# Patient Record
Sex: FEMALE | Race: WHITE | NOT HISPANIC OR LATINO | URBAN - METROPOLITAN AREA
[De-identification: names, ages, dates, MRNs, and addresses within clinical notes are randomized per-mention and may not be internally consistent; named-entity substitution may affect disease eponyms.]

---

## 2024-11-11 ENCOUNTER — OFFICE VISIT (OUTPATIENT)
Dept: PRIMARY CARE CLINIC | Facility: CLINIC | Age: 24
End: 2024-11-11
Payer: COMMERCIAL

## 2024-11-11 VITALS
DIASTOLIC BLOOD PRESSURE: 68 MMHG | OXYGEN SATURATION: 98 % | TEMPERATURE: 98 F | HEART RATE: 74 BPM | WEIGHT: 218.25 LBS | RESPIRATION RATE: 20 BRPM | BODY MASS INDEX: 33.08 KG/M2 | SYSTOLIC BLOOD PRESSURE: 128 MMHG | HEIGHT: 68 IN

## 2024-11-11 DIAGNOSIS — F41.1 GENERALIZED ANXIETY DISORDER WITH PANIC ATTACKS: ICD-10-CM

## 2024-11-11 DIAGNOSIS — F32.A DEPRESSION, UNSPECIFIED DEPRESSION TYPE: ICD-10-CM

## 2024-11-11 DIAGNOSIS — Z83.3 FAMILY HISTORY OF TYPE 2 DIABETES MELLITUS: ICD-10-CM

## 2024-11-11 DIAGNOSIS — F41.0 GENERALIZED ANXIETY DISORDER WITH PANIC ATTACKS: ICD-10-CM

## 2024-11-11 DIAGNOSIS — Z00.00 ROUTINE MEDICAL EXAM: Primary | ICD-10-CM

## 2024-11-11 DIAGNOSIS — Z23 INFLUENZA VACCINE NEEDED: ICD-10-CM

## 2024-11-11 DIAGNOSIS — H26.09: ICD-10-CM

## 2024-11-11 DIAGNOSIS — E66.811 OBESITY, CLASS I, BMI 30-34.9: ICD-10-CM

## 2024-11-11 PROCEDURE — 99999 PR PBB SHADOW E&M-NEW PATIENT-LVL V: CPT | Mod: PBBFAC,,, | Performed by: INTERNAL MEDICINE

## 2024-11-11 RX ORDER — PAROXETINE 10 MG/1
10 TABLET, FILM COATED ORAL EVERY MORNING
Qty: 30 TABLET | Refills: 2 | Status: SHIPPED | OUTPATIENT
Start: 2024-11-11

## 2024-11-11 RX ORDER — LEVONORGESTREL AND ETHINYL ESTRADIOL AND ETHINYL ESTRADIOL 150-30(84)
1 KIT ORAL DAILY
COMMUNITY
Start: 2024-10-29

## 2024-11-12 ENCOUNTER — LAB VISIT (OUTPATIENT)
Dept: LAB | Facility: HOSPITAL | Age: 24
End: 2024-11-12
Attending: INTERNAL MEDICINE
Payer: COMMERCIAL

## 2024-11-12 ENCOUNTER — TELEPHONE (OUTPATIENT)
Dept: BEHAVIORAL HEALTH | Facility: CLINIC | Age: 24
End: 2024-11-12
Payer: COMMERCIAL

## 2024-11-12 DIAGNOSIS — Z00.00 ROUTINE MEDICAL EXAM: ICD-10-CM

## 2024-11-12 DIAGNOSIS — F41.1 GENERALIZED ANXIETY DISORDER WITH PANIC ATTACKS: ICD-10-CM

## 2024-11-12 DIAGNOSIS — Z83.3 FAMILY HISTORY OF TYPE 2 DIABETES MELLITUS: ICD-10-CM

## 2024-11-12 DIAGNOSIS — F41.0 GENERALIZED ANXIETY DISORDER WITH PANIC ATTACKS: ICD-10-CM

## 2024-11-12 DIAGNOSIS — F32.A DEPRESSION, UNSPECIFIED DEPRESSION TYPE: ICD-10-CM

## 2024-11-12 DIAGNOSIS — H26.09: ICD-10-CM

## 2024-11-12 LAB
ALBUMIN SERPL BCP-MCNC: 3.9 G/DL (ref 3.5–5.2)
ALP SERPL-CCNC: 43 U/L (ref 40–150)
ALT SERPL W/O P-5'-P-CCNC: 33 U/L (ref 10–44)
ANION GAP SERPL CALC-SCNC: 5 MMOL/L (ref 8–16)
AST SERPL-CCNC: 18 U/L (ref 10–40)
BASOPHILS # BLD AUTO: 0.06 K/UL (ref 0–0.2)
BASOPHILS NFR BLD: 0.8 % (ref 0–1.9)
BILIRUB SERPL-MCNC: 0.6 MG/DL (ref 0.1–1)
BUN SERPL-MCNC: 10 MG/DL (ref 6–20)
CALCIUM SERPL-MCNC: 9.7 MG/DL (ref 8.7–10.5)
CHLORIDE SERPL-SCNC: 109 MMOL/L (ref 95–110)
CHOLEST SERPL-MCNC: 213 MG/DL (ref 120–199)
CHOLEST/HDLC SERPL: 3.8 {RATIO} (ref 2–5)
CO2 SERPL-SCNC: 25 MMOL/L (ref 23–29)
CREAT SERPL-MCNC: 0.8 MG/DL (ref 0.5–1.4)
CRP SERPL-MCNC: 4 MG/L (ref 0–8.2)
DIFFERENTIAL METHOD BLD: ABNORMAL
EOSINOPHIL # BLD AUTO: 0.1 K/UL (ref 0–0.5)
EOSINOPHIL NFR BLD: 1.2 % (ref 0–8)
ERYTHROCYTE [DISTWIDTH] IN BLOOD BY AUTOMATED COUNT: 12.9 % (ref 11.5–14.5)
ERYTHROCYTE [SEDIMENTATION RATE] IN BLOOD BY PHOTOMETRIC METHOD: 5 MM/HR (ref 0–36)
EST. GFR  (NO RACE VARIABLE): >60 ML/MIN/1.73 M^2
ESTIMATED AVG GLUCOSE: 88 MG/DL (ref 68–131)
GLUCOSE SERPL-MCNC: 86 MG/DL (ref 70–110)
HBA1C MFR BLD: 4.7 % (ref 4–5.6)
HCT VFR BLD AUTO: 39.1 % (ref 37–48.5)
HDLC SERPL-MCNC: 56 MG/DL (ref 40–75)
HDLC SERPL: 26.3 % (ref 20–50)
HGB BLD-MCNC: 12.7 G/DL (ref 12–16)
IMM GRANULOCYTES # BLD AUTO: 0.02 K/UL (ref 0–0.04)
IMM GRANULOCYTES NFR BLD AUTO: 0.3 % (ref 0–0.5)
LDLC SERPL CALC-MCNC: 140.2 MG/DL (ref 63–159)
LYMPHOCYTES # BLD AUTO: 3.5 K/UL (ref 1–4.8)
LYMPHOCYTES NFR BLD: 48.2 % (ref 18–48)
MCH RBC QN AUTO: 30.8 PG (ref 27–31)
MCHC RBC AUTO-ENTMCNC: 32.5 G/DL (ref 32–36)
MCV RBC AUTO: 95 FL (ref 82–98)
MONOCYTES # BLD AUTO: 0.5 K/UL (ref 0.3–1)
MONOCYTES NFR BLD: 6.8 % (ref 4–15)
NEUTROPHILS # BLD AUTO: 3.1 K/UL (ref 1.8–7.7)
NEUTROPHILS NFR BLD: 42.7 % (ref 38–73)
NONHDLC SERPL-MCNC: 157 MG/DL
NRBC BLD-RTO: 0 /100 WBC
PLATELET # BLD AUTO: 238 K/UL (ref 150–450)
PMV BLD AUTO: 13 FL (ref 9.2–12.9)
POTASSIUM SERPL-SCNC: 4.3 MMOL/L (ref 3.5–5.1)
PROT SERPL-MCNC: 7.1 G/DL (ref 6–8.4)
RBC # BLD AUTO: 4.13 M/UL (ref 4–5.4)
SODIUM SERPL-SCNC: 139 MMOL/L (ref 136–145)
TRIGL SERPL-MCNC: 84 MG/DL (ref 30–150)
TSH SERPL DL<=0.005 MIU/L-ACNC: 2.22 UIU/ML (ref 0.4–4)
WBC # BLD AUTO: 7.35 K/UL (ref 3.9–12.7)

## 2024-11-12 PROCEDURE — 83036 HEMOGLOBIN GLYCOSYLATED A1C: CPT | Performed by: INTERNAL MEDICINE

## 2024-11-12 PROCEDURE — 86039 ANTINUCLEAR ANTIBODIES (ANA): CPT | Performed by: INTERNAL MEDICINE

## 2024-11-12 PROCEDURE — 86225 DNA ANTIBODY NATIVE: CPT | Performed by: INTERNAL MEDICINE

## 2024-11-12 PROCEDURE — 80061 LIPID PANEL: CPT | Performed by: INTERNAL MEDICINE

## 2024-11-12 PROCEDURE — 85652 RBC SED RATE AUTOMATED: CPT | Performed by: INTERNAL MEDICINE

## 2024-11-12 PROCEDURE — 84443 ASSAY THYROID STIM HORMONE: CPT | Performed by: INTERNAL MEDICINE

## 2024-11-12 PROCEDURE — 36415 COLL VENOUS BLD VENIPUNCTURE: CPT | Mod: PN | Performed by: INTERNAL MEDICINE

## 2024-11-12 PROCEDURE — 86038 ANTINUCLEAR ANTIBODIES: CPT | Performed by: INTERNAL MEDICINE

## 2024-11-12 PROCEDURE — 80053 COMPREHEN METABOLIC PANEL: CPT | Performed by: INTERNAL MEDICINE

## 2024-11-12 PROCEDURE — 86140 C-REACTIVE PROTEIN: CPT | Performed by: INTERNAL MEDICINE

## 2024-11-12 PROCEDURE — 85025 COMPLETE CBC W/AUTO DIFF WBC: CPT | Performed by: INTERNAL MEDICINE

## 2024-11-12 NOTE — PROGRESS NOTES
Contacted patient for intake, Pt is unable to do it now but please call back tomorrow, 11/13/2024 at 3:00pm for intake to schedule with Misha Waldron LPC. Patient portal is not set up yet.

## 2024-11-13 ENCOUNTER — TELEPHONE (OUTPATIENT)
Dept: PRIMARY CARE CLINIC | Facility: CLINIC | Age: 24
End: 2024-11-13
Payer: COMMERCIAL

## 2024-11-13 ENCOUNTER — TELEPHONE (OUTPATIENT)
Dept: BEHAVIORAL HEALTH | Facility: CLINIC | Age: 24
End: 2024-11-13
Payer: COMMERCIAL

## 2024-11-13 DIAGNOSIS — R76.8 POSITIVE ANA (ANTINUCLEAR ANTIBODY): Primary | ICD-10-CM

## 2024-11-13 LAB
ANA PATTERN 1: NORMAL
ANA SER QL IF: POSITIVE
ANA TITR SER IF: NORMAL {TITER}

## 2024-11-13 NOTE — TELEPHONE ENCOUNTER
Portal account is not fully set up, could not send results message.   Labs are all normal except the Lupus screening is positive - Rheumatology referral ordered. There is a high rate of false-positive lupus tests, additional evaluation needed before we can draw any conclusions.

## 2024-11-13 NOTE — PROGRESS NOTES
Subjective     Patient ID: Annalee Alves is a 24 y.o. female.    Chief Complaint: Establish Care    She is new to [facility]. Recently moved back, presents to establish care. C/O exacerbation of anxiety, requesting treatment.     PM/SH: G0  Depression and Anxiety with Panic attacks, Eating disorder, and lately OCD features since adolescence. Tried a few different meds in the past, can not recall which ones. No SI, no hospital admissions.   Juvenile Cataracts, s/p bilateral cataract extraction and lens implants 10/24.   Obesity.   Tonsillectomy.   Pelvic exam 1/23 in [place].   Vaccines reviewed.    Social: No tobacco or drugs, no alcohol lately. Single, no children, active with male partner. Bachelor's degree in . 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    FMH: Arthritis in mother. Depression and ADHD in brother. Alcohol abuse in PGF. Diabetes in MGM.    NKDA.    Medications: OCP's.       Review of Systems   Constitutional:  Positive for appetite change. Negative for activity change, fatigue, fever and unexpected weight change.   HENT:  Negative for nasal congestion, ear pain, hearing loss, rhinorrhea, sneezing, sore throat, trouble swallowing and voice change.    Eyes:  Negative for pain, discharge and visual disturbance.   Respiratory:  Negative for cough, chest tightness, shortness of breath and wheezing.    Cardiovascular:  Positive for palpitations. Negative for chest pain and leg swelling.        Brief palpitations when anxious.   Gastrointestinal:  Negative for abdominal pain, blood in stool, constipation, diarrhea, nausea and vomiting.   Endocrine: Negative for polydipsia and polyuria.   Genitourinary:  Negative for difficulty urinating, dysuria, frequency, hematuria, menstrual problem and pelvic pain.        Menstrual period every 3 months on current OCP's.   Musculoskeletal:  Negative for arthralgias, gait problem, joint swelling, myalgias and neck pain.   Integumentary:  Negative for color change and  "rash.   Neurological:  Positive for headaches. Negative for dizziness, syncope, facial asymmetry, speech difficulty, weakness and numbness.        "Tension" in back of head and neck.   Hematological:  Negative for adenopathy. Does not bruise/bleed easily.   Psychiatric/Behavioral:  Positive for dysphoric mood and sleep disturbance. Negative for agitation, behavioral problems, confusion, decreased concentration, self-injury and suicidal ideas. The patient is nervous/anxious.         Anxious, occasional panic, "intrusive thoughts", binge eating, repetitive hand washing, loss of appetite, decreased sleep, anhedonia and decreased motivation. No missed school or work, but does skip out on social gatherings.          Objective   Vitals:    11/11/24 1458   BP: 128/68   BP Location: Right arm   Patient Position: Sitting   Pulse: 74   Resp: 20   Temp: 98.1 °F (36.7 °C)   TempSrc: Oral   SpO2: 98%   Weight: 99 kg (218 lb 4.1 oz)   Height: 5' 8" (1.727 m)   BMI=33.2  Physical Exam  Vitals reviewed.   Constitutional:       General: She is not in acute distress.     Appearance: She is well-developed. She is obese. She is not diaphoretic.   HENT:      Head: Normocephalic and atraumatic.      Right Ear: Tympanic membrane and ear canal normal.      Left Ear: Tympanic membrane and ear canal normal.      Nose: Nose normal. No congestion.      Mouth/Throat:      Mouth: Mucous membranes are moist.      Pharynx: Oropharynx is clear.   Eyes:      General: No scleral icterus.     Extraocular Movements: Extraocular movements intact.      Conjunctiva/sclera: Conjunctivae normal.      Right eye: Right conjunctiva is not injected.      Left eye: Left conjunctiva is not injected.      Comments: Bilateral lens implants.   Neck:      Thyroid: No thyromegaly.      Vascular: No JVD.   Cardiovascular:      Rate and Rhythm: Normal rate and regular rhythm.      Pulses: Normal pulses.      Heart sounds: Normal heart sounds. No murmur heard.     No " friction rub. No gallop.   Pulmonary:      Effort: Pulmonary effort is normal. No respiratory distress.      Breath sounds: Normal breath sounds. No wheezing, rhonchi or rales.   Abdominal:      General: Bowel sounds are normal. There is no distension.      Palpations: Abdomen is soft. There is no mass.      Tenderness: There is no abdominal tenderness.   Musculoskeletal:         General: No tenderness or deformity. Normal range of motion.      Cervical back: Normal range of motion and neck supple.      Right lower leg: No edema.      Left lower leg: No edema.   Lymphadenopathy:      Cervical: No cervical adenopathy.   Skin:     General: Skin is warm and dry.      Coloration: Skin is not pale.      Findings: No erythema or rash.      Nails: There is no clubbing.   Neurological:      General: No focal deficit present.      Mental Status: She is alert and oriented to person, place, and time.      Cranial Nerves: No cranial nerve deficit.      Motor: No weakness or abnormal muscle tone.      Coordination: Coordination normal.      Gait: Gait normal.   Psychiatric:         Attention and Perception: Attention normal.         Mood and Affect: Mood and affect normal.         Speech: Speech normal.         Behavior: Behavior normal.         Thought Content: Thought content normal.         Judgment: Judgment normal.      Comments: Pleasant, openly talkative in no acute emotional distress.          Assessment and Plan     1. Routine medical exam  -     CBC Auto Differential; Future; Expected date: 11/11/2024  -     Comprehensive Metabolic Panel; Future; Expected date: 11/11/2024  -     Lipid Panel; Future; Expected date: 11/11/2024    2. Generalized anxiety disorder with panic attacks  -     TSH; Future; Expected date: 11/11/2024  -     Ambulatory referral/consult to Primary Care Behavioral Health (Non-Opioids); Future; Expected date: 11/18/2024  -     paroxetine (PAXIL) 10 MG tablet; Take 1 tablet (10 mg total) by mouth  every morning.  Dispense: 30 tablet; Refill: 2    3. Depression, unspecified depression type  -     TSH; Future; Expected date: 11/11/2024  -     Ambulatory referral/consult to Primary Care Behavioral Health (Non-Opioids); Future; Expected date: 11/18/2024  -     paroxetine (PAXIL) 10 MG tablet; Take 1 tablet (10 mg total) by mouth every morning.  Dispense: 30 tablet; Refill: 2    4. Other juvenile cataract of both eyes  -     Sedimentation rate; Future; Expected date: 11/11/2024  -     C-Reactive Protein; Future; Expected date: 11/11/2024  -     PUSHPA Screen w/Reflex; Future; Expected date: 11/11/2024    5. Obesity, Class I, BMI 30-34.9        -    lean diet and regular exercise encouraged.     6. Family history of type 2 diabetes mellitus  -     Hemoglobin A1C; Future; Expected date: 11/11/2024    7. Influenza vaccine needed - Flu shot today.       Follow up in about 2 months (around 1/11/2025).

## 2024-11-13 NOTE — PROGRESS NOTES
Contacted patient for intake, no answer left VM to please call [phone]. Portal is not set up yet.

## 2024-11-14 ENCOUNTER — TELEPHONE (OUTPATIENT)
Dept: BEHAVIORAL HEALTH | Facility: CLINIC | Age: 24
End: 2024-11-14
Payer: COMMERCIAL

## 2024-11-14 NOTE — PROGRESS NOTES
CHW reached out to pt to complete intake, no answer, LVM to pls call to schedule an appt. No patient portal is set up yet.

## 2024-11-15 LAB
ANTI SM ANTIBODY: 0.07 RATIO (ref 0–0.99)
ANTI SM/RNP ANTIBODY: 0.06 RATIO (ref 0–0.99)
ANTI-SM INTERPRETATION: NEGATIVE
ANTI-SM/RNP INTERPRETATION: NEGATIVE
ANTI-SSA ANTIBODY: 0.05 RATIO (ref 0–0.99)
ANTI-SSA INTERPRETATION: NEGATIVE
ANTI-SSB ANTIBODY: 0.05 RATIO (ref 0–0.99)
ANTI-SSB INTERPRETATION: NEGATIVE
DSDNA AB SER-ACNC: NORMAL [IU]/ML

## 2024-11-18 NOTE — TELEPHONE ENCOUNTER
Called pt to give information left by provider. Pt did not answer. LVM with instructions to call back.

## 2024-11-20 ENCOUNTER — TELEPHONE (OUTPATIENT)
Dept: BEHAVIORAL HEALTH | Facility: CLINIC | Age: 24
End: 2024-11-20
Payer: COMMERCIAL

## 2024-11-20 NOTE — PROGRESS NOTES
Behavioral Health Community Health Worker  Initial Assessment  Completed by:  Susanne Clark     Date:  11/20/2024    Patient Enrollment in Behavioral Health Program:  Patient verbalized understanding of Behavioral Health Integration services to include:  Patient understands that CHW, LCSW, PharmD and consulting Psychiatrist are members of the care team working collaboratively with his/her primary care provider: Yes  Patient understands that activation of their [facility] patient portal account is required for accessing the full scope of team services: Yes  Patient understands that some counseling sessions may occur via video: Yes  Clinic visits with the psychiatrist may be subject to a co-pay based on your insurance: Yes  Patient consents to enroll in BHI program: Yes    Assessments     Single Item Health Literacy Scale:  How often do you need to have someone help you read instructions, pamphlets or other written material from your doctor or pharmacy?: Never    Promis 10:  Promis 10 Responses  In general, would you say your health is: Very good  In general, would you say your quality of life is: Very good  In general, how would you rate your physical health?: Good  In general, how would you rate your mental health, including your mood and your ability to think?: Fair  In general, how would you rate your satisfaction with your social activities and relationships?: Fair  In general, please rate how well you carry out your usual social activities and roles. (This includes activities at home, at work and in your community, and responsibilities as a parent, child, spouse, employee, friend, etc.): Good  To what extent are you able to carry out your everyday physical activities such as walking, climbing stairs, carrying groceries, or moving a chair? : Completely  How often have you been bothered by emotional problems such as feeling anxious, depressed or irritable?: Often  In the past 7 days, how would you rate your fatigue  on average?: Moderate  In the past 7 days, on a scale of 0 to 10 (where 0 is no pain and 10 is the worst pain imaginable) how would you rate your pain on average?: 2  Global Physical Health: 13  Global Mental health Score: 12    Depression PHQ:      11/20/2024     8:49 AM   PHQ9   Little interest or pleasure in doing things 2   Feeling down, depressed, or hopeless 2   Trouble falling or staying asleep, or sleeping too much 3   Feeling tired or having little energy 3   Poor appetite or overeating 3   Feeling bad about yourself - or that you are a failure or have let yourself or your family down 2   Trouble concentrating on things, such as reading the newspaper or watching television 2   Moving or speaking so slowly that other people could have noticed. Or the opposite - being so fidgety or restless that you have been moving around a lot more than usual 1   PHQ-9 Total Score 18         Generalized Anxiety Disorder 7-Item Scale:      11/20/2024     8:52 AM   GAD7   1. Feeling nervous, anxious, or on edge? 3   2. Not being able to stop or control worrying? 3   3. Worrying too much about different things? 3   4. Trouble relaxing? 2   5. Being so restless that it is hard to sit still? 3   6. Becoming easily annoyed or irritable? 2   7. Feeling afraid as if something awful might happen? 3   8. If you checked off any problems, how difficult have these problems made it for you to do your work, take care of things at home, or get along with other people? 2   EMILY-7 Score 19       History     Social History     Socioeconomic History    Marital status: Single   Tobacco Use    Smoking status: Never    Smokeless tobacco: Never   Substance and Sexual Activity    Alcohol use: Not Currently    Drug use: Never    Sexual activity: Yes     Partners: Male     Birth control/protection: OCP, Condom   Social History Narrative    Single, no children. Bachelors Degree in . Going to Hair School. Regular diet, minimal caffeine.  "Exercise running, cycling, yoga six days per week.      Social Drivers of Health     Financial Resource Strain: Medium Risk (11/4/2024)    Overall Financial Resource Strain (CARDIA)     Difficulty of Paying Living Expenses: Somewhat hard   Food Insecurity: No Food Insecurity (11/4/2024)    Hunger Vital Sign     Worried About Running Out of Food in the Last Year: Never true     Ran Out of Food in the Last Year: Never true   Physical Activity: Sufficiently Active (11/4/2024)    Exercise Vital Sign     Days of Exercise per Week: 6 days     Minutes of Exercise per Session: 50 min   Stress: Stress Concern Present (11/4/2024)    [employer] - Occupational Stress Questionnaire     Feeling of Stress : Very much   Housing Stability: Unknown (11/4/2024)    Housing Stability Vital Sign     Unable to Pay for Housing in the Last Year: No       Call Summary     Patient was referred to the BHI (Non-opioid) program by Primary Care Provider, Dr. Sheba Galan.  CHW contacted Annalee Alves who reports depression and anxiety that limits her activities of daily living (ADLs).   Patient scored "18" on the PHQ9 and "19" on the EMILY 7. Based on these scores patient is eligible for the Behavioral Health Integration (Non-opioid) Program. CHW completed the intake and scheduled a new patient virtual appointment for patient with Misha Waldron LPC on Monday, 12/2/2024 at 3:30pm.       "

## 2024-11-29 NOTE — PROGRESS NOTES
"Primary Care Behavioral Health Integration: Initial  Date:  12/02/2024  Referral Source:  Sheba Galan MD  Type of Visit:  Video Session  Length of Appointment:  37 minutes spent face-to-face and 14 minutes spent in non face-to-face clinical care.    The patient location is:  [place] [address]  The patient phone number is: [phone]  Visit type: Virtual visit with synchronous audio and video  Each patient to whom he or she provides medical services by telemedicine is:  (1) informed of the relationship between the physician and patient and the respective role of any other health care provider with respect to management of the patient; and (2) notified that he or she may decline to receive medical services by telemedicine and may withdraw from such care at any time.    Chief Complaint/Reason for Encounter:  Anxiety and Depression    History of Present Illness: Annalee Alves, a 24 y.o. female referred by Sheba Galan MD.  Patient was seen, examined and chart was reviewed. Met with patient.       LPC provided a description of the [facility] program, reviewed confidentialty and limits to confidentiality, and discussed safety planning in the event patient experiences suicidal or homicidal ideation or plans to harm herself.  Patient began this Initial Assessment by stating she experiences symptoms of Obsessive-Compulsive Disorder and Depression.  Noted the OCD symptoms began in adolescence.  Patient is currently prescribed Paxil 10 mg which she stated has been helping with her anxiety, but has not stopped the compulsive behaviors.  Patient noted she has a difficult time going into her own bathroom when she knows others have used it.  Stated she can visualize germs.  Reported she often checks to see if her car doors are locked.  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  She has no problems getting to work on time.  Reported symptoms include anhedonia, insomnia, worthlessness/guilt, hopelessness, fatigue, difficulty concentrating, decreased appetite, excessive worrying, restlessness, panic attacks, and obsessions/compulsions.  Patient entered into an Intensive Eating Disorder Program which focused on purging, restricting, and bingeing.  Patient will be referred to Psychiatry for longer-term therapy based on the severity of her symptoms.          Past Medical History:   Diagnosis Date    Generalized anxiety disorder with panic attacks          Current Outpatient Medications:     paroxetine (PAXIL) 10 MG tablet, Take 1 tablet (10 mg total) by mouth every morning., Disp: 30 tablet, Rfl: 2    SIMPESSE 0.15 mg-30 mcg (84)/10 mcg (7) 3MPk, Take 1 tablet by mouth once daily., Disp: , Rfl:     Current symptoms:  Depression Symptoms: anhedonia, insomnia, worthlessness/guilt, hopelessness, fatigue, difficulty concentrating, and decreased appetite.  Anxiety Symptoms: excessive worrying, restlessness, panic attacks, and obsessions/compulsions.  Sleep Difficulties: Patient reports difficulty falling asleep and Patient reports non-restful sleep  Manic Symptoms:  denies.  Psychosis: denies .    Risk assessment:  Patient reports no suicidal ideation  Patient reports no homicidal ideation  Patient reports no self-injurious behavior  Patient reports no violent behavior    Patient advised to call [phone] or present the the nearest ED if they experience suicidal or homicidal ideation, plan or intent.      Psychiatric History:  Diagnosis:    Current Psychiatric Medication: Yes - Paxil 10 mg. 1 tablet by mouth every morning. They are interested in medication changes.   Medication Trial History:  Medication Trials: Yes - Lexapro, Wellbutrin   Outpatient Treatment: Yes - 2019 - 2021 - Intensive Eating Disorder Program - purging, restricting, and bingeing.      Inpatient  Treatment: No   Suicide Attempts: No   Access to Firearms: No   History of Trauma: No   Family Psychiatric History: Older brother - Lost time - Dissociation     Current and Past Substance Use:  Alcohol: Wine - one or two a month   Drugs: None   Nicotine: None   Caffeine:  Coffee and tea daily      Mental Status Exam  General Appearance:  appears stated age, neatly dressed, well groomed   Speech: normal tone, normal rate, normal pitch, normal volume      Level of Cooperation: cooperative      Thought Processes: linear, logical, goal-directed   Mood: anxious      Thought Content: {relevant and appropriate   Affect: anxious   Orientation: Oriented x4   Memory/Attention/Concentration: No gross cognitive deficits made evident during conversation   Judgment & Insight: good   Language  intact          No data to display                    11/20/2024     8:52 AM   GAD7   1. Feeling nervous, anxious, or on edge? 3   2. Not being able to stop or control worrying? 3   3. Worrying too much about different things? 3   4. Trouble relaxing? 2   5. Being so restless that it is hard to sit still? 3   6. Becoming easily annoyed or irritable? 2   7. Feeling afraid as if something awful might happen? 3   8. If you checked off any problems, how difficult have these problems made it for you to do your work, take care of things at home, or get along with other people? 2   EMILY-7 Score 19       Impression: Initial appointment focused on gathering history, identifying treatment goals and developing a treatment plan.      Patient experiences anhedonia, insomnia, worthlessness/guilt, hopelessness, fatigue, difficulty concentrating, decreased appetite, excessive worrying, restlessness, panic attacks, and obsessions/compulsions as evidenced by fear of uncertainty, racing and intrusive thoughts, and irritability.  These symptoms are making it difficult for patient to function effectively.      Diagnosis:  No diagnosis found.    Treatment Goals  and Plan:   Anxiety: reducing negative automatic thoughts, reducing physical symptoms of anxiety, and reducing time spent worrying (<30 minutes/day)  Depression: reducing excessive guilt, reducing fatigue, and reducing negative automatic thoughts    Future treatment will utilize CBT, Problem-solving Therapy, and Solution-focused Therapy.      Return to Clinic:  Patient will be referred to Psychiatry for the treatment of her Obsessive-Compulsive Disorder and Depression.

## 2024-12-02 ENCOUNTER — CLINICAL SUPPORT (OUTPATIENT)
Dept: BEHAVIORAL HEALTH | Facility: CLINIC | Age: 24
End: 2024-12-02
Payer: COMMERCIAL

## 2024-12-02 DIAGNOSIS — F42.9 OBSESSIVE-COMPULSIVE DISORDER, UNSPECIFIED TYPE: Primary | ICD-10-CM

## 2024-12-02 DIAGNOSIS — F32.A DEPRESSION, UNSPECIFIED DEPRESSION TYPE: ICD-10-CM

## 2024-12-02 DIAGNOSIS — F41.0 GENERALIZED ANXIETY DISORDER WITH PANIC ATTACKS: ICD-10-CM

## 2024-12-02 DIAGNOSIS — F41.1 GENERALIZED ANXIETY DISORDER WITH PANIC ATTACKS: ICD-10-CM

## 2024-12-02 PROCEDURE — 90791 PSYCH DIAGNOSTIC EVALUATION: CPT | Mod: 95,,, | Performed by: COUNSELOR

## 2024-12-03 ENCOUNTER — PATIENT MESSAGE (OUTPATIENT)
Dept: BEHAVIORAL HEALTH | Facility: CLINIC | Age: 24
End: 2024-12-03
Payer: COMMERCIAL

## 2024-12-04 ENCOUNTER — OFFICE VISIT (OUTPATIENT)
Dept: RHEUMATOLOGY | Facility: CLINIC | Age: 24
End: 2024-12-04
Payer: COMMERCIAL

## 2024-12-04 VITALS
HEART RATE: 90 BPM | HEIGHT: 68 IN | WEIGHT: 211.19 LBS | SYSTOLIC BLOOD PRESSURE: 119 MMHG | DIASTOLIC BLOOD PRESSURE: 79 MMHG | BODY MASS INDEX: 32.01 KG/M2

## 2024-12-04 DIAGNOSIS — R76.8 POSITIVE ANA (ANTINUCLEAR ANTIBODY): ICD-10-CM

## 2024-12-04 DIAGNOSIS — Z98.49 HISTORY OF CATARACT EXTRACTION, UNSPECIFIED LATERALITY: Primary | ICD-10-CM

## 2024-12-04 PROCEDURE — 3078F DIAST BP <80 MM HG: CPT | Mod: CPTII,S$GLB,, | Performed by: STUDENT IN AN ORGANIZED HEALTH CARE EDUCATION/TRAINING PROGRAM

## 2024-12-04 PROCEDURE — 99999 PR PBB SHADOW E&M-EST. PATIENT-LVL III: CPT | Mod: PBBFAC,,, | Performed by: STUDENT IN AN ORGANIZED HEALTH CARE EDUCATION/TRAINING PROGRAM

## 2024-12-04 PROCEDURE — 1159F MED LIST DOCD IN RCRD: CPT | Mod: CPTII,S$GLB,, | Performed by: STUDENT IN AN ORGANIZED HEALTH CARE EDUCATION/TRAINING PROGRAM

## 2024-12-04 PROCEDURE — 3008F BODY MASS INDEX DOCD: CPT | Mod: CPTII,S$GLB,, | Performed by: STUDENT IN AN ORGANIZED HEALTH CARE EDUCATION/TRAINING PROGRAM

## 2024-12-04 PROCEDURE — 3044F HG A1C LEVEL LT 7.0%: CPT | Mod: CPTII,S$GLB,, | Performed by: STUDENT IN AN ORGANIZED HEALTH CARE EDUCATION/TRAINING PROGRAM

## 2024-12-04 PROCEDURE — 3074F SYST BP LT 130 MM HG: CPT | Mod: CPTII,S$GLB,, | Performed by: STUDENT IN AN ORGANIZED HEALTH CARE EDUCATION/TRAINING PROGRAM

## 2024-12-04 PROCEDURE — 99204 OFFICE O/P NEW MOD 45 MIN: CPT | Mod: S$GLB,,, | Performed by: STUDENT IN AN ORGANIZED HEALTH CARE EDUCATION/TRAINING PROGRAM

## 2024-12-04 NOTE — PROGRESS NOTES
RHEUMATOLOGY OUTPATIENT CLINIC NOTE    12/4/2024    Attending Rheumatologist: Marilee Jane  Primary Care Provider: Sheba Galan MD   Physician Requesting Consultation: Sheba Galan MD  [address] [place] [address]  Chief Complaint/Reason For Consultation:  Positive PUSHPA      Subjective:       HPI  Annalee Alves is a 24 y.o. White female who comes for evaluation of positive PUSHPA    She has a history of bilateral cataracts that was diagnosed about 2 years ago and had cataract surgery 2 months ago. She was told by ophthalmologist (her uncle) that it looked as she has had inflammation in the past but she does not recall any episodes of eye redness, eye pain, light sensitivity.  Has intermittent areas of dry skin around the mouth.   Reports pain in bilateral ankles and knees, present at the end of the day, no swelling or erythema. Pain is not too severe for her to take medication. Aggravated after workout  (running and strength training)   Denies any photosensitivity, oral ulcers, CP, SOB, hair loss, alopecia, raynaud's, blood in stool, dysphagia.     No smoking or vaping. No alcohol use. No illicit drug use.   She works in retail.     She reports that early cataract development runs in the family.    Grandmother with psoriatic arthritis.     Labs  11/2024  PUSHPA 1:1280 speckled  Negative dsDNA, SSA, SSB, SM, SM/RNP  CBC without cytopenia  CMP unremarkable  ESR, CRP negative  TSH normal    Review of Systems   Constitutional:  Negative for fever and unexpected weight change.   HENT:  Negative for mouth sores and trouble swallowing.    Eyes:  Negative for redness.   Respiratory:  Negative for cough and shortness of breath.    Cardiovascular:  Negative for chest pain.   Gastrointestinal:  Negative for constipation and diarrhea.   Genitourinary:  Negative for dysuria and genital sores.   Integumentary:  Positive for rash.   Neurological:  Positive for headaches.    Hematological:  Does not bruise/bleed easily.        Chronic comorbid conditions affecting medical decision making today:  Past Medical History:   Diagnosis Date    Generalized anxiety disorder with panic attacks      Past Surgical History:   Procedure Laterality Date    CATARACT EXTRACTION W/ INTRAOCULAR LENS  IMPLANT, BILATERAL  10/2024    TONSILLECTOMY       Family History   Problem Relation Name Age of Onset    Arthritis Mother      Depression Brother      ADD / ADHD Brother      Diabetes Maternal Grandmother      Alcohol abuse Paternal Grandfather       Social History     Substance and Sexual Activity   Alcohol Use Not Currently     Social History     Tobacco Use   Smoking Status Never   Smokeless Tobacco Never     Social History     Substance and Sexual Activity   Drug Use Never       Current Outpatient Medications:     paroxetine (PAXIL) 10 MG tablet, Take 1 tablet (10 mg total) by mouth every morning., Disp: 30 tablet, Rfl: 2    SIMPESSE 0.15 mg-30 mcg (84)/10 mcg (7) 3MPk, Take 1 tablet by mouth once daily., Disp: , Rfl:      Objective:         Vitals:    12/04/24 1120   BP: 119/79   Pulse: 90     Physical Exam   Constitutional: She is oriented to person, place, and time. She appears well-developed.   HENT:   Head: Normocephalic.   Pulmonary/Chest: Effort normal.   Musculoskeletal:         General: No swelling or tenderness.      Cervical back: Neck supple.   Neurological: She is alert and oriented to person, place, and time.   Skin:   No Heliotrope rash  No Gottron papules  No sclerodactyly   No Raynaud's  No Petechiae  No discoid lesions  No alopecia  Normal Capillaroscopy   Vitals reviewed.      Reviewed old and all outside pertinent medical records available.    All lab results personally reviewed and interpreted by me.  Lab Results   Component Value Date    WBC 7.35 11/12/2024    HGB 12.7 11/12/2024    HCT 39.1 11/12/2024    MCV 95 11/12/2024    MCH 30.8 11/12/2024    MCHC 32.5 11/12/2024    RDW 12.9  "11/12/2024     11/12/2024    MPV 13.0 (H) 11/12/2024       Lab Results   Component Value Date     11/12/2024    K 4.3 11/12/2024     11/12/2024    CO2 25 11/12/2024    GLU 86 11/12/2024    BUN 10 11/12/2024    CALCIUM 9.7 11/12/2024    PROT 7.1 11/12/2024    ALBUMIN 3.9 11/12/2024    BILITOT 0.6 11/12/2024    AST 18 11/12/2024    ALKPHOS 43 11/12/2024    ALT 33 11/12/2024       No results found for: "COLORU", "APPEARANCEUA", "SPECGRAV", "PHUR", "PHUA", "PROTEINUA", "GLUCOSEU", "KETONESU", "BLOODU", "LEUKOCYTESUR", "NITRITE", "UROBILINOGEN"    Lab Results   Component Value Date    CRP 4.0 11/12/2024       Lab Results   Component Value Date    SEDRATE 5 11/12/2024       No components found for: "25OHVITDTOT", "16BNIQMA9", "51PHWHWK3", "METHODNOTE"    No results found for: "URICACID"    No results found for: "QUANTIFERON", "HEPBCOREIGG", "HEPBSAB", "HEPBSAG", "HEPCAB"    Imaging:  All imaging reviewed and independently interpreted by me.     ASSESSMENT / PLAN:     Annalee Alves is a 24 y.o. White female with:    1. Positive PUSHPA (antinuclear antibody)  -high titer PUSHPA :1280 homogeneous with negative profile  -no clinical signs or symptoms suggestive of CTD now. Discussed what symptoms to look for and to inform me if anything.   -complete workup with complements and UPCR  -reassurance    2. History of cataract extraction, unspecified laterality (Primary)  -she has a history of bilateral cataract surgery. She was told by ophthalmologist (her uncle) that it looked as she has had inflammation in the past but she does not recall any episodes of eye redness, eye pain, light sensitivity. She reports extensive family history of early cataract development but she is the youngest one.   -check HLAB27 now  -discussed with patient that if she develops any eye symptoms, to contact ophthalmology for prompt evaluation  -doubt positive PUSHPA is relate to this.     Follow up if symptoms worsen or fail to " improve.    Method of contact with patient concerns: [facility]    Disclaimer:  This note is prepared using voice recognition software and as such is likely to have errors and has not been proof read. Please contact me for questions.     Time spent: 20 minutes in face to face discussion concerning diagnosis, prognosis, review of lab and test results, benefits of treatment as well as management of disease, counseling of patient and coordination of care between various health care providers.      Marilee Jane M.D.  Rheumatology  [facility]

## 2024-12-04 NOTE — PROGRESS NOTES
12/1/2024     8:08 PM   Rapid3 Question Responses and Scores   MDHAQ Score 0.1   Psychologic Score 7.7   Pain Score 4   When you awakened in the morning OVER THE LAST WEEK, did you feel stiff? Yes   If Yes, please indicate the number of hours until you are as limber as you will be for the day 1   Fatigue Score 7   Global Health Score 3   RAPID3 Score 2.44

## 2025-01-22 DIAGNOSIS — F32.A DEPRESSION, UNSPECIFIED DEPRESSION TYPE: ICD-10-CM

## 2025-01-22 DIAGNOSIS — F41.0 GENERALIZED ANXIETY DISORDER WITH PANIC ATTACKS: ICD-10-CM

## 2025-01-22 DIAGNOSIS — F41.1 GENERALIZED ANXIETY DISORDER WITH PANIC ATTACKS: ICD-10-CM

## 2025-01-22 NOTE — TELEPHONE ENCOUNTER
No care due was identified.  [facility] Embedded Care Due Messages. Reference number: [id number].   1/22/2025 12:28:58 PM CST

## 2025-01-26 RX ORDER — PAROXETINE 10 MG/1
10 TABLET, FILM COATED ORAL EVERY MORNING
Qty: 30 TABLET | Refills: 3 | Status: SHIPPED | OUTPATIENT
Start: 2025-01-26

## 2025-04-07 ENCOUNTER — OFFICE VISIT (OUTPATIENT)
Dept: PRIMARY CARE CLINIC | Facility: CLINIC | Age: 25
End: 2025-04-07
Payer: COMMERCIAL

## 2025-04-07 VITALS
TEMPERATURE: 98 F | WEIGHT: 213.19 LBS | DIASTOLIC BLOOD PRESSURE: 72 MMHG | BODY MASS INDEX: 32.31 KG/M2 | OXYGEN SATURATION: 98 % | HEART RATE: 68 BPM | HEIGHT: 68 IN | SYSTOLIC BLOOD PRESSURE: 114 MMHG

## 2025-04-07 DIAGNOSIS — F41.0 GENERALIZED ANXIETY DISORDER WITH PANIC ATTACKS: Primary | ICD-10-CM

## 2025-04-07 DIAGNOSIS — Z12.4 CERVICAL CANCER SCREENING: ICD-10-CM

## 2025-04-07 DIAGNOSIS — F32.9 REACTIVE DEPRESSION: ICD-10-CM

## 2025-04-07 DIAGNOSIS — F41.1 GENERALIZED ANXIETY DISORDER WITH PANIC ATTACKS: Primary | ICD-10-CM

## 2025-04-07 PROCEDURE — 1159F MED LIST DOCD IN RCRD: CPT | Mod: CPTII,S$GLB,, | Performed by: INTERNAL MEDICINE

## 2025-04-07 PROCEDURE — 3078F DIAST BP <80 MM HG: CPT | Mod: CPTII,S$GLB,, | Performed by: INTERNAL MEDICINE

## 2025-04-07 PROCEDURE — 3008F BODY MASS INDEX DOCD: CPT | Mod: CPTII,S$GLB,, | Performed by: INTERNAL MEDICINE

## 2025-04-07 PROCEDURE — 1160F RVW MEDS BY RX/DR IN RCRD: CPT | Mod: CPTII,S$GLB,, | Performed by: INTERNAL MEDICINE

## 2025-04-07 PROCEDURE — 3074F SYST BP LT 130 MM HG: CPT | Mod: CPTII,S$GLB,, | Performed by: INTERNAL MEDICINE

## 2025-04-07 PROCEDURE — 99213 OFFICE O/P EST LOW 20 MIN: CPT | Mod: S$GLB,,, | Performed by: INTERNAL MEDICINE

## 2025-04-07 PROCEDURE — 99999 PR PBB SHADOW E&M-EST. PATIENT-LVL IV: CPT | Mod: PBBFAC,,, | Performed by: INTERNAL MEDICINE

## 2025-04-07 RX ORDER — PAROXETINE 10 MG/1
10 TABLET, FILM COATED ORAL EVERY MORNING
Qty: 90 TABLET | Refills: 1 | Status: SHIPPED | OUTPATIENT
Start: 2025-04-07

## 2025-04-07 NOTE — PROGRESS NOTES
Subjective     Patient ID: Annalee Alves is a 25 y.o. female.    Chief Complaint: Follow-up and Insomnia    Seen for initial visit to [facility] care five months ago C/O exacerbation of anxiety with mild depression and features of OCD after recently relocating to [place] adjusting to work and school - requested medical management. Started Paxil 10 mg daily. She had one encounter with Behavioral Medicine three months ago, was not doing well at the time - referred to Psychiatry but she hasn't pursued this. Continued taking Paxil without adverse effects, and is feeling much better now. Reports significant improvement in anxiety, depression resolved. Doing well in school. Feeling much better, wishes to continue treatment the same, no dose increase needed.      PM/SH: G0  Depression and Anxiety with Panic attacks, Eating disorder, and lately OCD features since adolescence. Tried a few different meds in the past, can not recall which ones. No SI, no hospital admissions.   Juvenile Cataracts, s/p bilateral cataract extraction and lens implants 10/24.   Obesity.   Tonsillectomy.   Pelvic exam 1/23 in [place].   Vaccines reviewed.  Labs normal 11/24 including CBC, CMP, HbA1c, Lipids, TSH.     Social: No tobacco or drugs, no alcohol lately. Single, no children, active with male partner. Bachelor's degree in . 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    FMH: Arthritis in mother. Depression and ADHD in brother. Alcohol abuse in PGF. Diabetes in MGM.    NKDA.    Medications: Paxil 10 mg daily. No longer using OCP's, wants an IUD.      Review of Systems   Constitutional:  Negative for activity change and unexpected weight change.        Has improved her eating habits, more pescaterian diet now, has lost some weight. Also eliminated caffeine.    HENT:  Negative for hearing loss, rhinorrhea and trouble swallowing.    Eyes:  Negative for discharge and visual disturbance.   Respiratory:  Negative for  "chest tightness and wheezing.    Cardiovascular:  Negative for chest pain and palpitations.   Gastrointestinal:  Negative for blood in stool, constipation, diarrhea and vomiting.   Endocrine: Negative for polydipsia and polyuria.   Genitourinary:  Negative for difficulty urinating, dysuria, hematuria and menstrual problem.   Musculoskeletal:  Negative for arthralgias, joint swelling and neck pain.   Neurological:  Negative for weakness and headaches.   Psychiatric/Behavioral:  Negative for confusion and dysphoric mood.         Sleeping better, no insomnia - gets tired with work and school, sleeping at night.           Objective   Vitals:    04/07/25 0824   BP: 114/72   BP Location: Right arm   Patient Position: Sitting   Pulse: 68   Temp: 98 °F (36.7 °C)   TempSrc: Oral   SpO2: 98%   Weight: 96.7 kg (213 lb 3 oz)      From 218 five months ago.   Height: 5' 8" (1.727 m)      Physical Exam  Constitutional:       General: She is not in acute distress.     Appearance: She is not ill-appearing.   Neurological:      Mental Status: She is alert.   Psychiatric:         Attention and Perception: Attention normal.         Mood and Affect: Mood and affect normal.         Speech: Speech normal.         Behavior: Behavior normal.         Thought Content: Thought content normal.         Judgment: Judgment normal.      Comments: In good spirits.             Assessment and Plan     1. Generalized anxiety disorder with panic attacks - improved, continue same.  -     paroxetine (PAXIL) 10 MG tablet; Take 1 tablet (10 mg total) by mouth every morning.  Dispense: 90 tablet; Refill: 1    2. Reactive depression - improved, continue same.   -     paroxetine (PAXIL) 10 MG tablet; Take 1 tablet (10 mg total) by mouth every morning.  Dispense: 90 tablet; Refill: 1    3. Cervical cancer screening  -     Ambulatory referral/consult to Gynecology; Future; Expected date: 04/14/2025       Follow up in about 7 months (around 11/7/2025) for Annual " Physical. .

## 2025-04-28 ENCOUNTER — OFFICE VISIT (OUTPATIENT)
Dept: OBSTETRICS AND GYNECOLOGY | Facility: CLINIC | Age: 25
End: 2025-04-28
Payer: COMMERCIAL

## 2025-04-28 ENCOUNTER — LAB VISIT (OUTPATIENT)
Dept: LAB | Facility: HOSPITAL | Age: 25
End: 2025-04-28
Attending: NURSE PRACTITIONER
Payer: COMMERCIAL

## 2025-04-28 ENCOUNTER — OFFICE VISIT (OUTPATIENT)
Dept: DERMATOLOGY | Facility: CLINIC | Age: 25
End: 2025-04-28
Payer: COMMERCIAL

## 2025-04-28 VITALS
SYSTOLIC BLOOD PRESSURE: 116 MMHG | DIASTOLIC BLOOD PRESSURE: 78 MMHG | BODY MASS INDEX: 32.11 KG/M2 | WEIGHT: 211.88 LBS | HEIGHT: 68 IN

## 2025-04-28 DIAGNOSIS — Z12.39 ENCOUNTER FOR SCREENING BREAST EXAMINATION: ICD-10-CM

## 2025-04-28 DIAGNOSIS — Z01.419 ENCOUNTER FOR GYNECOLOGICAL EXAMINATION: Primary | ICD-10-CM

## 2025-04-28 DIAGNOSIS — L71.0 PERIORIFICIAL DERMATITIS: Primary | ICD-10-CM

## 2025-04-28 DIAGNOSIS — Z11.3 ENCNTR SCREEN FOR INFECTIONS W SEXL MODE OF TRANSMISS: ICD-10-CM

## 2025-04-28 DIAGNOSIS — Z12.4 ENCOUNTER FOR SCREENING FOR MALIGNANT NEOPLASM OF CERVIX: ICD-10-CM

## 2025-04-28 DIAGNOSIS — Z30.430 ENCOUNTER FOR INSERTION OF MIRENA IUD: ICD-10-CM

## 2025-04-28 DIAGNOSIS — Z30.017 INSERTION OF NEXPLANON: ICD-10-CM

## 2025-04-28 LAB
HAV IGM SERPL QL IA: NORMAL
HBV CORE IGM SERPL QL IA: NORMAL
HBV SURFACE AG SERPL QL IA: NORMAL
HCV AB SERPL QL IA: NORMAL
HIV 1+2 AB+HIV1 P24 AG SERPL QL IA: NORMAL
T PALLIDUM IGG+IGM SER QL: NORMAL

## 2025-04-28 PROCEDURE — 1160F RVW MEDS BY RX/DR IN RCRD: CPT | Mod: CPTII,S$GLB,, | Performed by: NURSE PRACTITIONER

## 2025-04-28 PROCEDURE — 1159F MED LIST DOCD IN RCRD: CPT | Mod: CPTII,S$GLB,, | Performed by: DERMATOLOGY

## 2025-04-28 PROCEDURE — 99203 OFFICE O/P NEW LOW 30 MIN: CPT | Mod: S$GLB,,, | Performed by: DERMATOLOGY

## 2025-04-28 PROCEDURE — 99999 PR PBB SHADOW E&M-EST. PATIENT-LVL III: CPT | Mod: PBBFAC,,, | Performed by: NURSE PRACTITIONER

## 2025-04-28 PROCEDURE — 36415 COLL VENOUS BLD VENIPUNCTURE: CPT | Mod: PN

## 2025-04-28 PROCEDURE — 3008F BODY MASS INDEX DOCD: CPT | Mod: CPTII,S$GLB,, | Performed by: NURSE PRACTITIONER

## 2025-04-28 PROCEDURE — 99999 PR PBB SHADOW E&M-EST. PATIENT-LVL III: CPT | Mod: PBBFAC,,, | Performed by: DERMATOLOGY

## 2025-04-28 PROCEDURE — 87491 CHLMYD TRACH DNA AMP PROBE: CPT | Performed by: NURSE PRACTITIONER

## 2025-04-28 PROCEDURE — 86593 SYPHILIS TEST NON-TREP QUANT: CPT

## 2025-04-28 PROCEDURE — 99385 PREV VISIT NEW AGE 18-39: CPT | Mod: S$GLB,,, | Performed by: NURSE PRACTITIONER

## 2025-04-28 PROCEDURE — 87389 HIV-1 AG W/HIV-1&-2 AB AG IA: CPT

## 2025-04-28 PROCEDURE — 80074 ACUTE HEPATITIS PANEL: CPT

## 2025-04-28 PROCEDURE — 3078F DIAST BP <80 MM HG: CPT | Mod: CPTII,S$GLB,, | Performed by: NURSE PRACTITIONER

## 2025-04-28 PROCEDURE — 3074F SYST BP LT 130 MM HG: CPT | Mod: CPTII,S$GLB,, | Performed by: NURSE PRACTITIONER

## 2025-04-28 PROCEDURE — 1159F MED LIST DOCD IN RCRD: CPT | Mod: CPTII,S$GLB,, | Performed by: NURSE PRACTITIONER

## 2025-04-28 PROCEDURE — 1160F RVW MEDS BY RX/DR IN RCRD: CPT | Mod: CPTII,S$GLB,, | Performed by: DERMATOLOGY

## 2025-04-28 PROCEDURE — 88175 CYTOPATH C/V AUTO FLUID REDO: CPT | Mod: TC | Performed by: NURSE PRACTITIONER

## 2025-04-28 RX ORDER — PIMECROLIMUS 10 MG/G
CREAM TOPICAL 2 TIMES DAILY
Qty: 30 G | Refills: 1 | Status: SHIPPED | OUTPATIENT
Start: 2025-04-28

## 2025-04-28 NOTE — PROGRESS NOTES
Subjective:      Patient ID:  Annalee Alves is a 25 y.o. female who presents for   Chief Complaint   Patient presents with    Dry Skin     Dry Skin    Pt c/o dry skin around nose x a few months. No prev tx.  Was under mouth on the side, but that resolved.  Skin would be irritated and burn.  Thin layer of aquaphor sometimes helped.  Heavy products cause her to break out.    Review of Systems   Constitutional:  Negative for fever and chills.   Skin:  Positive for rash and dry skin. Negative for itching.       Objective:   Physical Exam   Constitutional: She appears well-developed and well-nourished.   Neurological: She is alert and oriented to person, place, and time.   Psychiatric: She has a normal mood and affect.   Skin:   Areas Examined (abnormalities noted in diagram):   Head / Face Inspection Performed            Diagram Legend     Erythematous scaling macule/papule c/w actinic keratosis       Vascular papule c/w angioma      Pigmented verrucoid papule/plaque c/w seborrheic keratosis      Yellow umbilicated papule c/w sebaceous hyperplasia      Irregularly shaped tan macule c/w lentigo     1-2 mm smooth white papules consistent with Milia      Movable subcutaneous cyst with punctum c/w epidermal inclusion cyst      Subcutaneous movable cyst c/w pilar cyst      Firm pink to brown papule c/w dermatofibroma      Pedunculated fleshy papule(s) c/w skin tag(s)      Evenly pigmented macule c/w junctional nevus     Mildly variegated pigmented, slightly irregular-bordered macule c/w mildly atypical nevus      Flesh colored to evenly pigmented papule c/w intradermal nevus       Pink pearly papule/plaque c/w basal cell carcinoma      Erythematous hyperkeratotic cursted plaque c/w SCC      Surgical scar with no sign of skin cancer recurrence      Open and closed comedones      Inflammatory papules and pustules      Verrucoid papule consistent consistent with wart     Erythematous eczematous patches and plaques      Dystrophic onycholytic nail with subungual debris c/w onychomycosis     Umbilicated papule    Erythematous-base heme-crusted tan verrucoid plaque consistent with inflamed seborrheic keratosis     Erythematous Silvery Scaling Plaque c/w Psoriasis     See annotation      Assessment / Plan:        Periorificial dermatitis  -     pimecrolimus (ELIDEL) 1 % cream; Apply topically 2 (two) times daily. To rashes around nose and mouth  Dispense: 30 g; Refill: 1    RTC prn

## 2025-04-28 NOTE — PATIENT INSTRUCTIONS

## 2025-04-28 NOTE — PROGRESS NOTES
HISTORY OF PRESENT ILLNESS:    Annalee Alves is a 25 y.o. female, No obstetric history on file., Patient's last menstrual period was 04/16/2025 (exact date).,  presents for a routine exam and has no complaints. Patient reports cycles occur every 4 weeks lasting 3-4 days using 3 pads per day. She denies any BTB. Pt denies breast, urinary or gyn issues.    Last Pap: About 4 years ago- normal per pt  History of abnormal pap: No   Last Mammogram: N/A  Fam hx of breast or ovarian cancer: No  Last Colonoscopy: N/A    She uses condoms for birth control.   She does desire STD screening.    The patient participates in regular exercise: yes.    The patient does not smoke.    The patient wears seatbelts.     Pt denies any domestic violence.    Past Medical History:   Diagnosis Date    Generalized anxiety disorder with panic attacks      Past Surgical History:   Procedure Laterality Date    CATARACT EXTRACTION W/ INTRAOCULAR LENS  IMPLANT, BILATERAL  10/2024    TONSILLECTOMY       MEDICATIONS AND ALLERGIES:    Current Medications[1]    Review of patient's allergies indicates:  No Known Allergies    Family History   Problem Relation Name Age of Onset    Arthritis Mother      Depression Brother      ADD / ADHD Brother      Diabetes Maternal Grandmother      Alcohol abuse Paternal Grandfather       Social History[2]    COMPREHENSIVE GYN HISTORY:  PAP History: Denies abnormal Paps.  Infection History: Denies STDs. Denies PID.  Benign History: Denies uterine fibroids. Denies ovarian cysts. Denies endometriosis. Denies other conditions.  Cancer History: Denies cervical cancer. Denies uterine cancer or hyperplasia. Denies ovarian cancer. Denies vulvar cancer or pre-cancer. Denies vaginal cancer or pre-cancer. Denies breast cancer. Denies colon cancer.  Sexual Activity History: Reports currently being sexually active  Menstrual History: Normal flow  Dysmenorrhea History: Mild cramping    ROS:  GENERAL: No weight changes. No  "swelling. No fatigue. No fever.  CARDIOVASCULAR: No chest pain. No shortness of breath. No leg cramps.   NEUROLOGICAL: No headaches. No vision changes.  BREASTS: No pain. No lumps. No discharge.  ABDOMEN: No pain. No nausea. No vomiting. No diarrhea. No constipation.  REPRODUCTIVE: No abnormal bleeding.   VULVA: No pain. No lesions. No itching.  VAGINA: No relaxation. No itching. No odor. No discharge. No lesions.  URINARY: No incontinence. No nocturia. No frequency. No dysuria.    /78   Ht 5' 8" (1.727 m)   Wt 96.1 kg (211 lb 13.8 oz)   LMP 04/16/2025 (Exact Date)   BMI 32.21 kg/m²     PE:  APPEARANCE: Well nourished, well developed, in no acute distress.  AFFECT: WNL, alert and oriented x 3.  SKIN: No acne or hirsutism.  NECK: Neck symmetric, without masses or thyromegaly.  NODES: No inguinal, cervical, axillary or femoral lymph node enlargement.  CHEST: Good respiratory effort.   ABDOMEN: Soft. No tenderness or masses. No hepatosplenomegaly. No hernias.  BREASTS: Symmetrical, no skin changes, visible lesions, palpable masses or nipple discharge bilaterally.  PELVIC: External female genitalia without lesions.  Female hair distribution. Adequate perineal body, Normal urethral meatus. Vagina moist and well rugated without lesions or discharge.  No significant cystocele or rectocele present. Cervix pink without lesions, discharge or tenderness. Uterus is normal, mobile and nontender. Adnexa without masses or tenderness. PAP SMEAR PERFORMED TODAY  EXTREMITIES: No edema    DIAGNOSIS:  1. Encounter for gynecological examination    2. Encounter for screening breast examination    3. Encounter for screening for malignant neoplasm of cervix    4. Encntr screen for infections w sexl mode of transmiss      PLAN:  -Pap smear performed today, A.C.S. pap guidelines discussed (every 3 yrs with hx of normal paps).   -CBE performed today. Recommend CBE yearly and encouraged breast self-exam/awareness. Pt verbalized " understanding.  -BC: pt desires nexplanon (1st priority) or Mirena IUD; will submit for authorization by insurance  -GC/CT culture performed today; STI blood labs pending.    Orders Placed This Encounter    C. trachomatis/N. gonorrhoeae by AMP DNA    Hepatitis Panel, Acute    HIV 1/2 Ag/Ab (4th Gen)    Treponema Pallidium Antibodies IgG, IgM    Liquid-Based Pap Smear, Screening     COUNSELING:  A.C.S. pap guidelines discussed (every 3 yrs with hx of normal paps).    FOLLOW-UP with me in 2 weeks for Nexplanon or Mirena IUD insertion and annually and follow up with PCP for other health maintenance.    Geri Arguello, DNP, RN, APRN, WHNP-BC  [facility]         [1]   Current Outpatient Medications:     paroxetine (PAXIL) 10 MG tablet, Take 1 tablet (10 mg total) by mouth every morning., Disp: 90 tablet, Rfl: 1    pimecrolimus (ELIDEL) 1 % cream, Apply topically 2 (two) times daily. To rashes around nose and mouth, Disp: 30 g, Rfl: 1  [2]   Social History  Socioeconomic History    Marital status: Single   Tobacco Use    Smoking status: Never    Smokeless tobacco: Never   Substance and Sexual Activity    Alcohol use: Not Currently    Drug use: Never    Sexual activity: Yes     Partners: Male     Birth control/protection: OCP, Condom   Social History Narrative    Single, no children. Bachelors Degree in . Going to [employer]. Regular diet, minimal caffeine. Exercise running, cycling, yoga six days per week.      Social Drivers of Health     Financial Resource Strain: Low Risk  (4/2/2025)    Overall Financial Resource Strain (CARDIA)     Difficulty of Paying Living Expenses: Not very hard   Food Insecurity: No Food Insecurity (4/2/2025)    Hunger Vital Sign     Worried About Running Out of Food in the Last Year: Never true     Ran Out of Food in the Last Year: Never true   Transportation Needs: No Transportation Needs (4/2/2025)    PRAPARE - Transportation     Lack of  Transportation (Medical): No     Lack of Transportation (Non-Medical): No   Physical Activity: Sufficiently Active (4/2/2025)    Exercise Vital Sign     Days of Exercise per Week: 3 days     Minutes of Exercise per Session: 60 min   Stress: No Stress Concern Present (4/2/2025)    [employer] [place] [employer] - Occupational Stress Questionnaire     Feeling of Stress : Only a little   Housing Stability: Low Risk  (4/2/2025)    Housing Stability Vital Sign     Unable to Pay for Housing in the Last Year: No     Number of Times Moved in the Last Year: 1     Homeless in the Last Year: No

## 2025-04-29 ENCOUNTER — RESULTS FOLLOW-UP (OUTPATIENT)
Dept: OBSTETRICS AND GYNECOLOGY | Facility: CLINIC | Age: 25
End: 2025-04-29

## 2025-05-01 ENCOUNTER — TELEPHONE (OUTPATIENT)
Dept: OBSTETRICS AND GYNECOLOGY | Facility: CLINIC | Age: 25
End: 2025-05-01
Payer: COMMERCIAL

## 2025-05-01 LAB
INSULIN SERPL-ACNC: ABNORMAL U[IU]/ML
LAB AP BETHESDA CATEGORY: ABNORMAL
LAB AP CLINICAL FINDINGS: ABNORMAL
LAB AP CONTRACEPTIVES: ABNORMAL
LAB AP LMP DATE: ABNORMAL
LAB AP OCHS PAP SPECIMEN ADEQUACY: ABNORMAL
LAB AP OHS PAP INTERPRETATION: ABNORMAL
LAB AP PAP DISCLAIMER COMMENTS: ABNORMAL
LAB AP PAP ESTROGEN REPLACEMENT THERAPY: ABNORMAL
LAB AP PAP PMP: ABNORMAL
LAB AP PAP PREVIOUS BX: ABNORMAL
LAB AP PAP PRIOR TREATMENT: ABNORMAL
LAB AP PERFORMING LOCATION(S): ABNORMAL

## 2025-05-01 NOTE — TELEPHONE ENCOUNTER
Called and lvm for pt in regards to getting scheduled for colpo. Advised pt to give us a call back or message us in the pt portal.     ----- Message from EDISON Gastelum sent at 5/1/2025  2:45 PM CDT -----  Regarding: Colpo needed  Hello,Please call pt to schedule a colpo with one of the MDs for an abnormal pap smear. Thanks,Geri COBB, NP

## 2025-05-03 LAB
C TRACH DNA SPEC QL NAA+PROBE: NOT DETECTED
CTGC SOURCE (OHS) ORD-325: NORMAL
N GONORRHOEA DNA UR QL NAA+PROBE: NOT DETECTED

## 2025-05-05 ENCOUNTER — PROCEDURE VISIT (OUTPATIENT)
Dept: OBSTETRICS AND GYNECOLOGY | Facility: CLINIC | Age: 25
End: 2025-05-05
Payer: COMMERCIAL

## 2025-05-05 VITALS — WEIGHT: 215.75 LBS | BODY MASS INDEX: 32.8 KG/M2 | DIASTOLIC BLOOD PRESSURE: 68 MMHG | SYSTOLIC BLOOD PRESSURE: 108 MMHG

## 2025-05-05 DIAGNOSIS — Z30.430 ENCOUNTER FOR IUD INSERTION: Primary | ICD-10-CM

## 2025-05-05 DIAGNOSIS — Z23 ENCOUNTER FOR IMMUNIZATION: ICD-10-CM

## 2025-05-05 LAB
B-HCG UR QL: NEGATIVE
CTP QC/QA: YES

## 2025-05-05 PROCEDURE — 58300 INSERT INTRAUTERINE DEVICE: CPT | Mod: S$GLB,,, | Performed by: NURSE PRACTITIONER

## 2025-05-05 PROCEDURE — 81025 URINE PREGNANCY TEST: CPT | Mod: S$GLB,,, | Performed by: NURSE PRACTITIONER

## 2025-05-05 NOTE — PROCEDURES
Insertion of IUD    Date/Time: 5/5/2025 1:55 PM    Performed by: Geri Arguello WHNP  Authorized by: Geri Arguello WHNP    Consent:     Consent obtained:  Prior to procedure the appropriate consent was completed and verified    Consent given by:  Patient    Procedure risks and benefits discussed: yes      Patient questions answered: yes      Patient agrees, verbalizes understanding, and wants to proceed: yes     Device to be inserted was verified by patient: yes    Educational handouts given: yes      Instructions and paperwork completed: yes    Insertion Procedure:   1 Intra Uterine Device levonorgestreL 52 mg       Pelvic exam performed: yes      Negative GC/chlamydia test: yes      Negative urine pregnancy test: yes      Cervix cleaned and prepped: yes      Speculum placed in vagina: yes      Tenaculum applied to cervix: yes      Uterus sounded: yes      Uterus sound depth (cm):  8    IUD inserted with no complications: yes      IUD type:  Mirena    Strings trimmed: yes    Post-procedure:     Patient tolerated procedure well: yes      Patient will follow up after next period: yes    Comments:      Discussed cramping/bleeding precautions, s/s of infection, and device expulsion. IUD string check in 6 weeks. Pt may alternate ibuprofen/tylenol for discomfort. Advised pt to rest today, avoid inserting anything in the vagina, avoid heavy lifting/straining today. Pt verbalized understanding.    Pt desires Gardasil vaccine, gave her prescription for gardasil to be received at [facility]; discussed benefits, risks, side effects, timeline for next 2 doses. Pt verbalized understanding.

## 2025-05-12 ENCOUNTER — PATIENT MESSAGE (OUTPATIENT)
Dept: OBSTETRICS AND GYNECOLOGY | Facility: CLINIC | Age: 25
End: 2025-05-12
Payer: COMMERCIAL

## 2025-06-13 NOTE — PROGRESS NOTES
HISTORY OF PRESENT ILLNESS:    Annalee Alves is a 25 y.o. female, , No LMP recorded (lmp unknown). Patient has had an implant.,  presents for IUD string check. Pt had the Mirena IUD insertion on 2025. Pt reports just spotting, no pain.     Past Medical History:   Diagnosis Date    Depression     Generalized anxiety disorder with panic attacks      Past Surgical History:   Procedure Laterality Date    CATARACT EXTRACTION W/ INTRAOCULAR LENS  IMPLANT, BILATERAL  10/2024    EYE SURGERY  2024    cataract surgery, both eyes    TONSILLECTOMY       MEDICATIONS AND ALLERGIES:    Current Medications[1]    Review of patient's allergies indicates:  No Known Allergies    Family History   Problem Relation Name Age of Onset    Arthritis Mother Jill Essex     Depression Brother      ADD / ADHD Brother      Diabetes Maternal Grandmother Jodi Carlos     Psoriasis Maternal Grandmother Jodi Carlos     Alcohol abuse Paternal Grandfather James Alves     Depression Brother Quan Alves     Mental illness Brother Quan Alves      Social History[2]    ROS:  GENERAL: No weight changes. No swelling. No fatigue. No fever.  BREASTS: No pain. No lumps. No discharge.  ABDOMEN: No pain. No nausea. No vomiting. No diarrhea. No constipation.  REPRODUCTIVE: No abnormal bleeding.   VULVA: No pain. No lesions. No itching.  VAGINA: No relaxation. No itching. No odor. No discharge. No lesions.  URINARY: No incontinence. No nocturia. No frequency. No dysuria.    /66 (BP Location: Left arm, Patient Position: Sitting)   Wt 89.8 kg (197 lb 15.6 oz)   LMP  (LMP Unknown)   BMI 30.10 kg/m²     PE:  APPEARANCE: Well nourished, well developed, in no acute distress.  AFFECT: WNL, alert and oriented x 3.  SKIN: Warm and dry  PELVIC: External female genitalia without lesions.  Female hair distribution. Adequate perineal body, Normal urethral meatus. Vagina moist and well rugated without lesions or discharge.  No significant  cystocele or rectocele present. Cervix pink without lesions, discharge or tenderness. IUD strings visible.   EXTREMITIES: No edema    DIAGNOSIS:  1. Encounter for routine checking of intrauterine contraceptive device (IUD)      PLAN:  -IUD strings present     COUNSELING:  Condom use    FOLLOW-UP with me as needed     Time spent over 20 minutes   This includes face to face time and non-face to face time preparing to see the patient (eg, review of tests), obtaining and/or reviewing separately obtained history, documenting clinical information in the electronic or other health record, independently interpreting results and communicating results to the patient/family/caregiver, or care coordinator.     Geri Arguello, DNP, RN, APRN, WHNP-BC [facility]          [1]   Current Outpatient Medications:     paroxetine (PAXIL) 10 MG tablet, Take 1 tablet (10 mg total) by mouth every morning., Disp: 90 tablet, Rfl: 1    pimecrolimus (ELIDEL) 1 % cream, Apply topically 2 (two) times daily. To rashes around nose and mouth, Disp: 30 g, Rfl: 1    Current Facility-Administered Medications:     levonorgestreL (Mirena) 52 mg IUD 1 Intra Uterine Device, 1 Intra Uterine Device, Intrauterine, , , 1 Intra Uterine Device at 05/05/25 1355  [2]   Social History  Socioeconomic History    Marital status: Single   Tobacco Use    Smoking status: Never    Smokeless tobacco: Never   Substance and Sexual Activity    Alcohol use: Not Currently    Drug use: Never    Sexual activity: Yes     Partners: Male     Birth control/protection: Condom, I.U.D.   Social History Narrative    Single, no children. Bachelors Degree in . Going to [employer]. Regular diet, minimal caffeine. Exercise running, cycling, yoga six days per week.      Social Drivers of Health     Financial Resource Strain: Low Risk  (4/2/2025)    Overall Financial Resource Strain (CARDIA)     Difficulty of Paying Living Expenses: Not very  hard   Food Insecurity: No Food Insecurity (4/2/2025)    Hunger Vital Sign     Worried About Running Out of Food in the Last Year: Never true     Ran Out of Food in the Last Year: Never true   Transportation Needs: No Transportation Needs (4/2/2025)    PRAPARE - Transportation     Lack of Transportation (Medical): No     Lack of Transportation (Non-Medical): No   Physical Activity: Sufficiently Active (4/2/2025)    Exercise Vital Sign     Days of Exercise per Week: 3 days     Minutes of Exercise per Session: 60 min   Stress: No Stress Concern Present (4/2/2025)    [employer] [facility] [employer] [facility] - Occupational Stress Questionnaire     Feeling of Stress : Only a little   Housing Stability: Low Risk  (4/2/2025)    Housing Stability Vital Sign     Unable to Pay for Housing in the Last Year: No     Number of Times Moved in the Last Year: 1     Homeless in the Last Year: No

## 2025-06-16 ENCOUNTER — OFFICE VISIT (OUTPATIENT)
Dept: OBSTETRICS AND GYNECOLOGY | Facility: CLINIC | Age: 25
End: 2025-06-16
Payer: COMMERCIAL

## 2025-06-16 VITALS — BODY MASS INDEX: 30.1 KG/M2 | DIASTOLIC BLOOD PRESSURE: 66 MMHG | SYSTOLIC BLOOD PRESSURE: 104 MMHG | WEIGHT: 198 LBS

## 2025-06-16 DIAGNOSIS — Z30.431 ENCOUNTER FOR ROUTINE CHECKING OF INTRAUTERINE CONTRACEPTIVE DEVICE (IUD): Primary | ICD-10-CM

## 2025-06-16 PROCEDURE — 1159F MED LIST DOCD IN RCRD: CPT | Mod: CPTII,S$GLB,, | Performed by: NURSE PRACTITIONER

## 2025-06-16 PROCEDURE — 99213 OFFICE O/P EST LOW 20 MIN: CPT | Mod: S$GLB,,, | Performed by: NURSE PRACTITIONER

## 2025-06-16 PROCEDURE — 3074F SYST BP LT 130 MM HG: CPT | Mod: CPTII,S$GLB,, | Performed by: NURSE PRACTITIONER

## 2025-06-16 PROCEDURE — 1160F RVW MEDS BY RX/DR IN RCRD: CPT | Mod: CPTII,S$GLB,, | Performed by: NURSE PRACTITIONER

## 2025-06-16 PROCEDURE — 3008F BODY MASS INDEX DOCD: CPT | Mod: CPTII,S$GLB,, | Performed by: NURSE PRACTITIONER

## 2025-06-16 PROCEDURE — 99999 PR PBB SHADOW E&M-EST. PATIENT-LVL III: CPT | Mod: PBBFAC,,, | Performed by: NURSE PRACTITIONER

## 2025-06-16 PROCEDURE — 3078F DIAST BP <80 MM HG: CPT | Mod: CPTII,S$GLB,, | Performed by: NURSE PRACTITIONER

## 2025-06-24 DIAGNOSIS — F41.1 GENERALIZED ANXIETY DISORDER WITH PANIC ATTACKS: ICD-10-CM

## 2025-06-24 DIAGNOSIS — F41.0 GENERALIZED ANXIETY DISORDER WITH PANIC ATTACKS: ICD-10-CM

## 2025-06-24 DIAGNOSIS — F32.9 REACTIVE DEPRESSION: ICD-10-CM

## 2025-06-24 RX ORDER — PAROXETINE 10 MG/1
10 TABLET, FILM COATED ORAL EVERY MORNING
Qty: 90 TABLET | Refills: 1 | Status: CANCELLED | OUTPATIENT
Start: 2025-06-24

## 2025-06-24 NOTE — TELEPHONE ENCOUNTER
No care due was identified.  Health [facility] Embedded Care Due Messages. Reference number: [id number].   6/24/2025 12:54:40 PM CDT

## 2025-08-22 ENCOUNTER — OFFICE VISIT (OUTPATIENT)
Dept: OBSTETRICS AND GYNECOLOGY | Facility: CLINIC | Age: 25
End: 2025-08-22
Payer: COMMERCIAL

## 2025-08-22 ENCOUNTER — LAB VISIT (OUTPATIENT)
Dept: LAB | Facility: HOSPITAL | Age: 25
End: 2025-08-22
Attending: NURSE PRACTITIONER
Payer: COMMERCIAL

## 2025-08-22 VITALS
DIASTOLIC BLOOD PRESSURE: 82 MMHG | SYSTOLIC BLOOD PRESSURE: 126 MMHG | BODY MASS INDEX: 32.06 KG/M2 | WEIGHT: 210.88 LBS

## 2025-08-22 DIAGNOSIS — N89.8 VAGINAL DISCHARGE: Primary | ICD-10-CM

## 2025-08-22 DIAGNOSIS — Z11.3 ENCNTR SCREEN FOR INFECTIONS W SEXL MODE OF TRANSMISS: ICD-10-CM

## 2025-08-22 DIAGNOSIS — N89.8 VAGINAL ODOR: ICD-10-CM

## 2025-08-22 PROCEDURE — 80074 ACUTE HEPATITIS PANEL: CPT

## 2025-08-22 PROCEDURE — 36415 COLL VENOUS BLD VENIPUNCTURE: CPT | Mod: PN

## 2025-08-22 PROCEDURE — 99999 PR PBB SHADOW E&M-EST. PATIENT-LVL III: CPT | Mod: PBBFAC,,, | Performed by: NURSE PRACTITIONER

## 2025-08-22 PROCEDURE — 87389 HIV-1 AG W/HIV-1&-2 AB AG IA: CPT

## 2025-08-22 PROCEDURE — 86593 SYPHILIS TEST NON-TREP QUANT: CPT

## 2025-08-22 RX ORDER — METRONIDAZOLE 500 MG/1
500 TABLET ORAL 2 TIMES DAILY
Qty: 14 TABLET | Refills: 0 | Status: SHIPPED | OUTPATIENT
Start: 2025-08-22 | End: 2025-08-29

## 2025-08-22 RX ORDER — FLUCONAZOLE 150 MG/1
150 TABLET ORAL
Qty: 2 TABLET | Refills: 0 | Status: SHIPPED | OUTPATIENT
Start: 2025-08-22
